# Patient Record
Sex: FEMALE | Race: AMERICAN INDIAN OR ALASKA NATIVE | URBAN - NONMETROPOLITAN AREA
[De-identification: names, ages, dates, MRNs, and addresses within clinical notes are randomized per-mention and may not be internally consistent; named-entity substitution may affect disease eponyms.]

---

## 2022-03-30 ENCOUNTER — OFFICE VISIT (OUTPATIENT)
Dept: URBAN - NONMETROPOLITAN AREA CLINIC 1 | Facility: CLINIC | Age: 62
End: 2022-03-30
Payer: OTHER GOVERNMENT

## 2022-03-30 DIAGNOSIS — H52.4 PRESBYOPIA: ICD-10-CM

## 2022-03-30 DIAGNOSIS — H25.13 AGE-RELATED NUCLEAR CATARACT, BILATERAL: Primary | ICD-10-CM

## 2022-03-30 DIAGNOSIS — H40.9 GLAUCOMA: ICD-10-CM

## 2022-03-30 PROCEDURE — 92082 INTERMEDIATE VISUAL FIELD XM: CPT | Performed by: OPTOMETRIST

## 2022-03-30 PROCEDURE — 99203 OFFICE O/P NEW LOW 30 MIN: CPT | Performed by: OPTOMETRIST

## 2022-03-30 ASSESSMENT — KERATOMETRY
OD: 42.38
OS: 43.25

## 2022-03-30 ASSESSMENT — INTRAOCULAR PRESSURE
OS: 17
OD: 16

## 2022-03-30 ASSESSMENT — VISUAL ACUITY
OS: 20/20
OD: 20/20

## 2022-03-30 NOTE — IMPRESSION/PLAN
Impression: Glaucoma: H40.9. Plan: Discussed any loss of function due to glaucomatous vision loss is irreversible. Patient educated that treatment will not restore any loss of visual function and goal of treatment is to slow progression. Educated pt to continue to take glaucoma medications as previously prescribed from glaucoma specialist (Methazolamide 3x/day PO and Tafluprost QHS OU). Recommended pt attend all follow up visits. RTC if changes in vision or ocular comfort occur.